# Patient Record
Sex: MALE | URBAN - METROPOLITAN AREA
[De-identification: names, ages, dates, MRNs, and addresses within clinical notes are randomized per-mention and may not be internally consistent; named-entity substitution may affect disease eponyms.]

---

## 2018-05-22 ENCOUNTER — TRANSFERRED RECORDS (OUTPATIENT)
Dept: HEALTH INFORMATION MANAGEMENT | Facility: CLINIC | Age: 23
End: 2018-05-22

## 2018-09-07 ENCOUNTER — TRANSFERRED RECORDS (OUTPATIENT)
Dept: HEALTH INFORMATION MANAGEMENT | Facility: CLINIC | Age: 23
End: 2018-09-07

## 2019-03-29 NOTE — TELEPHONE ENCOUNTER
RECORDS RECEIVED FROM: 2nd Opinion for Left Elbow, Ulnar Nerve Snapping/Moving, Pt had surgery/imaging in May 2018 at [facility], [place], per Pt   DATE RECEIVED: 03/29/19    NOTES STATUS DETAILS   OFFICE NOTE from referring provider Received Dr. Brown 8/6/18   OFFICE NOTE from other specialist N/A    DISCHARGE SUMMARY from hospital N/A    DISCHARGE REPORT from the ER N/A    OPERATIVE REPORT Received 5/22/18 with Dr. Brown   MEDICATION LIST Received    IMPLANT RECORD/STICKER N/A    LABS     CBC/DIFF N/A    CULTURES N/A    INJECTIONS DONE IN RADIOLOGY N/A    MRI N/A    CT SCAN N/A    XRAYS (IMAGES & REPORTS) Received 5/2/18   TUMOR     PATHOLOGY  Slides & report N/A      03/29/19   5:53 PM  Faxed request to [facility]

## 2019-04-10 ENCOUNTER — PRE VISIT (OUTPATIENT)
Dept: ORTHOPEDICS | Facility: CLINIC | Age: 24
End: 2019-04-10